# Patient Record
(demographics unavailable — no encounter records)

---

## 2025-07-25 NOTE — ASSESSMENT
[FreeTextEntry1] : Mr. Emery is a very pleasant 51 year old man here today for BPH and elevated PSA. MRI images from [facility] radiology from 5/2024 reviewed demonstrating an overall suspicion score of PI-RADS 2 in the setting of a 125 cc prostate - We discussed normal age-adjusted PSA ranges -We discussed the potential etiologies of an elevated PSA, including but not limited to prostate cancer, urinary tract infections, prostatitis, BPH, and recent ejaculation. - We discussed favorable findings given negative biopsy in the past, nonconcerning MRI, and grossly enlarged prostate size on MRI - We discussed the option to repeat a prostate MRI or biopsy at this time, however given negative results in the past and grossly enlarged prostate is a potential etiology of elevated PSA he would like to forego this with which I agree - Follow-up in 6 months  Patient is being seen today for evaluation and management of a chronic and longitudinal ongoing condition and I am the primary treating physician

## 2025-07-25 NOTE — HISTORY OF PRESENT ILLNESS
[None] : None [FreeTextEntry1] : Mr. Emery is a very pleasant 51 year old man here today for BPH and elevated PSA. Referred by Dr. Peterson. He reports multiple doctors have told him he has sarcoidosis though he has not documentation of this. Diagnosed with lymphadenopathy. Reports extended history of elevated PSA and has had a biopsy done in the past. Prostate MRI 2024 demonstrated an overall suspicion score of PI-RADS 2 in the setting of a 125 cc prostate.  He wishes to forego another biopsy or MRI at this time.   Father had prostate cancer and was treated.